# Patient Record
Sex: MALE | Race: WHITE | NOT HISPANIC OR LATINO | Employment: OTHER | URBAN - METROPOLITAN AREA
[De-identification: names, ages, dates, MRNs, and addresses within clinical notes are randomized per-mention and may not be internally consistent; named-entity substitution may affect disease eponyms.]

---

## 2018-12-30 ENCOUNTER — OFFICE VISIT (OUTPATIENT)
Dept: URGENT CARE | Facility: CLINIC | Age: 63
End: 2018-12-30
Payer: MEDICARE

## 2018-12-30 VITALS
TEMPERATURE: 98.4 F | WEIGHT: 250 LBS | DIASTOLIC BLOOD PRESSURE: 88 MMHG | OXYGEN SATURATION: 97 % | SYSTOLIC BLOOD PRESSURE: 140 MMHG | HEART RATE: 67 BPM | HEIGHT: 72 IN | RESPIRATION RATE: 16 BRPM | BODY MASS INDEX: 33.86 KG/M2

## 2018-12-30 DIAGNOSIS — J00 ACUTE NASOPHARYNGITIS: ICD-10-CM

## 2018-12-30 DIAGNOSIS — H00.036 EYELID CELLULITIS, LEFT: Primary | ICD-10-CM

## 2018-12-30 PROCEDURE — 99213 OFFICE O/P EST LOW 20 MIN: CPT | Performed by: PHYSICIAN ASSISTANT

## 2018-12-30 PROCEDURE — G0463 HOSPITAL OUTPT CLINIC VISIT: HCPCS | Performed by: PHYSICIAN ASSISTANT

## 2018-12-30 RX ORDER — CEPHALEXIN 500 MG/1
500 CAPSULE ORAL EVERY 8 HOURS SCHEDULED
Qty: 15 CAPSULE | Refills: 0 | Status: SHIPPED | OUTPATIENT
Start: 2018-12-30 | End: 2019-01-04

## 2018-12-30 RX ORDER — TRAZODONE HYDROCHLORIDE 50 MG/1
50 TABLET ORAL
COMMUNITY

## 2018-12-30 RX ORDER — OMEPRAZOLE 20 MG/1
20 CAPSULE, DELAYED RELEASE ORAL DAILY
COMMUNITY

## 2018-12-30 RX ORDER — LOSARTAN POTASSIUM 25 MG/1
25 TABLET ORAL DAILY
COMMUNITY

## 2018-12-30 NOTE — PATIENT INSTRUCTIONS
Take Keflex as directed  Warm compresses  Change sheets and towels  Flonase for nasal symptoms  If no improvement in symptoms in 2-3 days follow-up with PCP  If anything changes or worsens follow-up sooner

## 2018-12-30 NOTE — PROGRESS NOTES
NAME: Reagan Luna is a 58 y o  male  : 1955    MRN: [id number]      Assessment and Plan   Eyelid cellulitis, left [H00 036]  1  Eyelid cellulitis, left  cephalexin (KEFLEX) 500 mg capsule   2  Acute nasopharyngitis         Patient reports his PCN allergy was a rash when he was very young  Reports taking keflex in the past without any problems  Patient Instructions   Patient Instructions   Take Keflex as directed  Warm compresses  Change sheets and towels  Flonase for nasal symptoms  If no improvement in symptoms in 2-3 days follow-up with PCP  If anything changes or worsens follow-up sooner    Proceed to ER if symptoms worsen  History of Present Illness     Patient with past medical history of CABG, valve replacement and currently on Coumadin treatment presents complaining of a stye in his left eyelid as well as sinus complaints  He reports yesterday he noticed a stye on his left upper eyelid  He reports putting on warm compresses and taking Viktor allergy drops but reports this morning he woke up and his eye lid was swollen and red  He reports that is eyelid is itchy but denies any blurry vision, double vision or photophobia  He also complains of 1 day history of sinus pain and pressure along with coryza and rhinorrhea  He reports an intermittent sore throat but states that today he said outside by a barbecue smoker for several hours and feels that he irritated his symptoms  Denies fever, chills, chest tightness, shortness of breath, dyspnea, ear pain  He reports he has not taken anything for symptoms  Review of Systems   Review of Systems   Constitutional: Negative for chills and fever  HENT: Positive for congestion, rhinorrhea, sinus pain, sinus pressure and sore throat  Negative for ear pain  Eyes: Positive for itching  Negative for photophobia, pain, discharge, redness and visual disturbance  Respiratory: Positive for cough   Negative for chest tightness, shortness of breath, wheezing and stridor  Current Medications       Current Outpatient Prescriptions:     aspirin 81 MG tablet, Take 81 mg by mouth daily  , Disp: , Rfl:     carvedilol (COREG) 12 5 mg tablet, Take 12 5 mg by mouth 2 (two) times a day with meals  , Disp: , Rfl:     citalopram (CeleXA) 40 mg tablet, Take 40 mg by mouth daily  , Disp: , Rfl:     furosemide (LASIX) 80 mg tablet, Take 80 mg by mouth daily  , Disp: , Rfl:     levothyroxine 25 mcg tablet, Take 25 mcg by mouth daily  , Disp: , Rfl:     losartan (COZAAR) 25 mg tablet, Take 25 mg by mouth daily, Disp: , Rfl:     omeprazole (PriLOSEC) 20 mg delayed release capsule, Take 20 mg by mouth daily, Disp: , Rfl:     simvastatin (ZOCOR) 80 mg tablet, Take 80 mg by mouth daily at bedtime  , Disp: , Rfl:     spironolactone (ALDACTONE) 25 mg tablet, Take 25 mg by mouth daily  , Disp: , Rfl:     traZODone (DESYREL) 50 mg tablet, Take 50 mg by mouth daily at bedtime, Disp: , Rfl:     warfarin (COUMADIN) 5 mg tablet, Take 5 mg by mouth daily  5mg 4x weekly, 7 5 3x per week, Disp: , Rfl:     cephalexin (KEFLEX) 500 mg capsule, Take 1 capsule (500 mg total) by mouth every 8 (eight) hours for 5 days, Disp: 15 capsule, Rfl: 0    Current Allergies     Allergies as of 12/30/2018 - Reviewed 12/30/2018   Allergen Reaction Noted    Penicillins Rash 07/08/2016              Past Medical History:   Diagnosis Date    Cardiac disease     CHF [facility]congestive heart failure) ([facility] [address] )     Depression     Hyperlipidemia     Hypertension        Past Surgical History:   Procedure Laterality Date    CORONARY ARTERY BYPASS GRAFT  2008       No family history on file  Medications have been verified      The following portions of the patient's history were reviewed and updated as appropriate: allergies, current medications, past family history, past medical history, past social history, past surgical history and problem list     Objective   /88   Pulse 67   Temp 98 4 °F (36 9 °C)   Resp 16   Ht 6' (1 829 m)   Wt 113 kg (250 lb)   SpO2 97%   BMI 33 91 kg/m²      Physical Exam     Physical Exam   Constitutional: He appears well-developed and well-nourished  No distress  HENT:   TMs clear bilaterally without erythema or bulging  Nasal mucosa without erythema or edema  Oropharynx without erythema, edema or exudates  +PND   Eyes: Pupils are equal, round, and reactive to light  Conjunctivae and EOM are normal    Left external eyelid with mild edema and erythema  Small area pinpoint white pustule at the lid margin  Mildly tender to palpation over the lid margin  No proptosis  No pain with EOM  No surrounding erythema  Cardiovascular: Normal rate and regular rhythm  Loud S2 consistent with shutting of mechanical valve   Pulmonary/Chest: Effort normal and breath sounds normal  No respiratory distress  He has no wheezes  He has no rales  Lymphadenopathy:     He has no cervical adenopathy